# Patient Record
Sex: MALE | Race: WHITE | NOT HISPANIC OR LATINO | Employment: FULL TIME | URBAN - METROPOLITAN AREA
[De-identification: names, ages, dates, MRNs, and addresses within clinical notes are randomized per-mention and may not be internally consistent; named-entity substitution may affect disease eponyms.]

---

## 2024-06-01 ENCOUNTER — HOSPITAL ENCOUNTER (EMERGENCY)
Facility: HOSPITAL | Age: 39
Discharge: HOME | End: 2024-06-01
Attending: EMERGENCY MEDICINE
Payer: COMMERCIAL

## 2024-06-01 VITALS
TEMPERATURE: 96.8 F | HEART RATE: 69 BPM | BODY MASS INDEX: 25.98 KG/M2 | OXYGEN SATURATION: 99 % | DIASTOLIC BLOOD PRESSURE: 76 MMHG | WEIGHT: 220 LBS | RESPIRATION RATE: 18 BRPM | SYSTOLIC BLOOD PRESSURE: 136 MMHG | HEIGHT: 77 IN

## 2024-06-01 DIAGNOSIS — F14.922: Primary | ICD-10-CM

## 2024-06-01 LAB
ALBUMIN SERPL BCP-MCNC: 5.1 G/DL (ref 3.4–5)
ALP SERPL-CCNC: 68 U/L (ref 33–120)
ALT SERPL W P-5'-P-CCNC: 23 U/L (ref 10–52)
AMPHETAMINES UR QL SCN: ABNORMAL
ANION GAP SERPL CALC-SCNC: 17 MMOL/L (ref 10–20)
APPEARANCE UR: CLEAR
AST SERPL W P-5'-P-CCNC: 22 U/L (ref 9–39)
BARBITURATES UR QL SCN: ABNORMAL
BASOPHILS # BLD AUTO: 0.04 X10*3/UL (ref 0–0.1)
BASOPHILS NFR BLD AUTO: 0.4 %
BENZODIAZ UR QL SCN: ABNORMAL
BILIRUB SERPL-MCNC: 0.4 MG/DL (ref 0–1.2)
BILIRUB UR STRIP.AUTO-MCNC: NEGATIVE MG/DL
BUN SERPL-MCNC: 19 MG/DL (ref 6–23)
BZE UR QL SCN: ABNORMAL
CALCIUM SERPL-MCNC: 9.6 MG/DL (ref 8.6–10.3)
CANNABINOIDS UR QL SCN: ABNORMAL
CHLORIDE SERPL-SCNC: 103 MMOL/L (ref 98–107)
CO2 SERPL-SCNC: 23 MMOL/L (ref 21–32)
COLOR UR: NORMAL
CREAT SERPL-MCNC: 1.18 MG/DL (ref 0.5–1.3)
EGFRCR SERPLBLD CKD-EPI 2021: 82 ML/MIN/1.73M*2
EOSINOPHIL # BLD AUTO: 0.01 X10*3/UL (ref 0–0.7)
EOSINOPHIL NFR BLD AUTO: 0.1 %
ERYTHROCYTE [DISTWIDTH] IN BLOOD BY AUTOMATED COUNT: 11.9 % (ref 11.5–14.5)
FENTANYL+NORFENTANYL UR QL SCN: ABNORMAL
GLUCOSE SERPL-MCNC: 113 MG/DL (ref 74–99)
GLUCOSE UR STRIP.AUTO-MCNC: NORMAL MG/DL
HCT VFR BLD AUTO: 46.8 % (ref 41–52)
HGB BLD-MCNC: 16.4 G/DL (ref 13.5–17.5)
HOLD SPECIMEN: NORMAL
HYALINE CASTS #/AREA URNS AUTO: ABNORMAL /LPF
IMM GRANULOCYTES # BLD AUTO: 0.06 X10*3/UL (ref 0–0.7)
IMM GRANULOCYTES NFR BLD AUTO: 0.7 % (ref 0–0.9)
KETONES UR STRIP.AUTO-MCNC: NEGATIVE MG/DL
LEUKOCYTE ESTERASE UR QL STRIP.AUTO: NEGATIVE
LYMPHOCYTES # BLD AUTO: 1.44 X10*3/UL (ref 1.2–4.8)
LYMPHOCYTES NFR BLD AUTO: 16.2 %
MAGNESIUM SERPL-MCNC: 1.95 MG/DL (ref 1.6–2.4)
MCH RBC QN AUTO: 31 PG (ref 26–34)
MCHC RBC AUTO-ENTMCNC: 35 G/DL (ref 32–36)
MCV RBC AUTO: 89 FL (ref 80–100)
METHADONE UR QL SCN: ABNORMAL
MONOCYTES # BLD AUTO: 0.53 X10*3/UL (ref 0.1–1)
MONOCYTES NFR BLD AUTO: 6 %
MUCOUS THREADS #/AREA URNS AUTO: ABNORMAL /LPF
NEUTROPHILS # BLD AUTO: 6.82 X10*3/UL (ref 1.2–7.7)
NEUTROPHILS NFR BLD AUTO: 76.6 %
NITRITE UR QL STRIP.AUTO: NEGATIVE
NRBC BLD-RTO: 0 /100 WBCS (ref 0–0)
OPIATES UR QL SCN: ABNORMAL
OXYCODONE+OXYMORPHONE UR QL SCN: ABNORMAL
PCP UR QL SCN: ABNORMAL
PH UR STRIP.AUTO: 5.5 [PH]
PLATELET # BLD AUTO: 251 X10*3/UL (ref 150–450)
POTASSIUM SERPL-SCNC: 4 MMOL/L (ref 3.5–5.3)
PROT SERPL-MCNC: 7.7 G/DL (ref 6.4–8.2)
PROT UR STRIP.AUTO-MCNC: NORMAL MG/DL
RBC # BLD AUTO: 5.29 X10*6/UL (ref 4.5–5.9)
RBC # UR STRIP.AUTO: NEGATIVE /UL
RBC #/AREA URNS AUTO: ABNORMAL /HPF
SODIUM SERPL-SCNC: 139 MMOL/L (ref 136–145)
SP GR UR STRIP.AUTO: 1.01
UROBILINOGEN UR STRIP.AUTO-MCNC: NORMAL MG/DL
WBC # BLD AUTO: 8.9 X10*3/UL (ref 4.4–11.3)
WBC #/AREA URNS AUTO: ABNORMAL /HPF

## 2024-06-01 PROCEDURE — 80053 COMPREHEN METABOLIC PANEL: CPT

## 2024-06-01 PROCEDURE — 83735 ASSAY OF MAGNESIUM: CPT

## 2024-06-01 PROCEDURE — 96360 HYDRATION IV INFUSION INIT: CPT

## 2024-06-01 PROCEDURE — 99283 EMERGENCY DEPT VISIT LOW MDM: CPT | Mod: 25

## 2024-06-01 PROCEDURE — 81001 URINALYSIS AUTO W/SCOPE: CPT | Mod: 59 | Performed by: EMERGENCY MEDICINE

## 2024-06-01 PROCEDURE — 80307 DRUG TEST PRSMV CHEM ANLYZR: CPT

## 2024-06-01 PROCEDURE — 85025 COMPLETE CBC W/AUTO DIFF WBC: CPT

## 2024-06-01 PROCEDURE — 2500000001 HC RX 250 WO HCPCS SELF ADMINISTERED DRUGS (ALT 637 FOR MEDICARE OP): Performed by: EMERGENCY MEDICINE

## 2024-06-01 PROCEDURE — 96361 HYDRATE IV INFUSION ADD-ON: CPT

## 2024-06-01 PROCEDURE — 36415 COLL VENOUS BLD VENIPUNCTURE: CPT

## 2024-06-01 PROCEDURE — 2500000004 HC RX 250 GENERAL PHARMACY W/ HCPCS (ALT 636 FOR OP/ED)

## 2024-06-01 RX ORDER — LORAZEPAM 0.5 MG/1
1 TABLET ORAL ONCE
Status: COMPLETED | OUTPATIENT
Start: 2024-06-01 | End: 2024-06-01

## 2024-06-01 RX ADMIN — LORAZEPAM 1 MG: 0.5 TABLET ORAL at 09:49

## 2024-06-01 RX ADMIN — SODIUM CHLORIDE, POTASSIUM CHLORIDE, SODIUM LACTATE AND CALCIUM CHLORIDE 1000 ML: 600; 310; 30; 20 INJECTION, SOLUTION INTRAVENOUS at 05:38

## 2024-06-01 ASSESSMENT — PAIN DESCRIPTION - PROGRESSION: CLINICAL_PROGRESSION: NOT CHANGED

## 2024-06-01 ASSESSMENT — LIFESTYLE VARIABLES
TOTAL SCORE: 4
HAVE PEOPLE ANNOYED YOU BY CRITICIZING YOUR DRINKING: YES
HAVE YOU EVER FELT YOU SHOULD CUT DOWN ON YOUR DRINKING: YES
EVER HAD A DRINK FIRST THING IN THE MORNING TO STEADY YOUR NERVES TO GET RID OF A HANGOVER: YES
EVER FELT BAD OR GUILTY ABOUT YOUR DRINKING: YES

## 2024-06-01 ASSESSMENT — PAIN DESCRIPTION - PAIN TYPE: TYPE: ACUTE PAIN

## 2024-06-01 ASSESSMENT — PAIN SCALES - GENERAL: PAINLEVEL_OUTOF10: 2

## 2024-06-01 ASSESSMENT — PAIN - FUNCTIONAL ASSESSMENT: PAIN_FUNCTIONAL_ASSESSMENT: 0-10

## 2024-06-01 NOTE — PROGRESS NOTES
I received Irineo Jimenez in signout from Dr. Martin.  Please see the previous note for all HPI, PE and MDM up to the time of signout at 0700.    In brief Irineo Jimenez is an 39 y.o. male presenting for Cocaine use and heart racing  Chief Complaint   Patient presents with    Palpitations   .  At the time of signout we were awaiting: Metabolizing to freedom    Patient presents after he used cocaine and has heart racing.  Patient has no evidence of significant agitation on my exam.  Patient asked for some Ativan which was given to him with good results and the patient was able to be discharged home.        Pt Disposition: discharged

## 2024-06-01 NOTE — ED PROVIDER NOTES
HPI   Chief Complaint   Patient presents with    Chest Pain    Palpitations       39-year-old male past medical history of bipolar 1 and frequent recreational substance use who presents today for cocaine intoxication.  Patient states that he was out earlier and used cocaine.  He then developed tachycardia on his smart watch as high as the 150s.  He also endorses sensation that he urinated his pants, but when he felt his pants they were not wet.  He does also endorse alcohol use tonight.  He states that he called 911 because he was concerned that he took laced cocaine.  He denies any current chest pain or shortness of breath.  He states that if he felt the way he feels now, he would not have called 911.  Denies any abdominal pain nausea vomiting diarrhea.  Denies any headache changes vision changes in hearing.  Denies any numbness weakness or tingling in his upper extremities, does have some tingling in his thighs, but no tingling in his feet.      History provided by:  Patient and EMS personnel   used: No                        [place] Coma Scale Score: 15                     Patient History   No past medical history on file.  No past surgical history on file.  No family history on file.  Social History     Tobacco Use    Smoking status: Not on file    Smokeless tobacco: Not on file   Substance Use Topics    Alcohol use: Not on file    Drug use: Not on file       Physical Exam   ED Triage Vitals [06/01/24 0529]   Temperature Heart Rate Respirations BP   36 °C (96.8 °F) (!) 138 20 (!) 174/107      Pulse Ox Temp src Heart Rate Source Patient Position   100 % -- Monitor --      BP Location FiO2 (%)     Right arm --       Physical Exam  Constitutional:       General: He is not in acute distress.     Appearance: Normal appearance. He is well-developed and normal weight. He is not ill-appearing or diaphoretic.   HENT:      Head: Normocephalic and atraumatic.      Mouth/Throat:      Mouth: Mucous  membranes are moist.      Pharynx: No oropharyngeal exudate or posterior oropharyngeal erythema.   Eyes:      General: No scleral icterus.     Extraocular Movements: Extraocular movements intact.      Pupils: Pupils are equal, round, and reactive to light.   Cardiovascular:      Rate and Rhythm: Regular rhythm. Tachycardia present.      Pulses: Normal pulses.           Radial pulses are 2+ on the right side and 2+ on the left side.      Heart sounds: Normal heart sounds. No murmur heard.     No gallop.   Pulmonary:      Effort: Pulmonary effort is normal. No tachypnea or respiratory distress.      Breath sounds: Normal breath sounds. No stridor. No decreased breath sounds, wheezing, rhonchi or rales.   Chest:      Chest wall: No tenderness.   Abdominal:      General: Bowel sounds are normal. There is no distension.      Palpations: Abdomen is soft. There is no mass.      Tenderness: There is no abdominal tenderness. There is no guarding or rebound.      Hernia: No hernia is present.   Musculoskeletal:         General: No swelling, deformity or signs of injury. Normal range of motion.      Cervical back: Normal range of motion and neck supple. No tenderness.      Right lower leg: No tenderness. No edema.      Left lower leg: No tenderness. No edema.   Skin:     General: Skin is warm.      Capillary Refill: Capillary refill takes less than 2 seconds.      Findings: No erythema, lesion or rash.   Neurological:      General: No focal deficit present.      Mental Status: He is alert and oriented to person, place, and time. Mental status is at baseline.   Psychiatric:         Mood and Affect: Mood is anxious.         Behavior: Behavior normal.      Comments: Mild psychomotor agitation, mildly hyperverbal.  Denies SI, HI, AVH.         ED Course & MDM   Diagnoses as of 06/01/24 0549   Cocaine intoxication with perceptual disturbance (Multi)       Medical Decision Making  39-year-old male past medical history of bipolar 1  and frequent recreational substance use who presents today for cocaine intoxication.  Patient does indeed appear to be intoxicated on cocaine, as he is hypertensive and tachycardic.  The patient is not currently experiencing chest pain or having diaphoresis, so my concern for there being cardiac complications is low.  Given this, we will not get a troponin.  However, given the fact the patient had sensory changes which are abnormal for him, we will get CBC RFP and mag to confirm he has no electrolyte abnormalities causing this.  Patient also requested a urine drug screen to look for other drugs that may have been what he used.  This seems reasonable to me.  I did also offer him lorazepam to help with his anxiety and also help with the stimulant intoxication.  Patient declined and would prefer to have it naturally come down on his home, which I think is reasonable.  Patient's labs were within normal limits other than a mildly elevated glucose, but is not fasting. Patient will be signed out to the oncoming team pending urine drug screen and clinical sobriety.        Procedure  Procedures     Maxwell Mancuso MD  Resident  06/01/24 4991       Maxwell Mancuso MD  Resident  06/01/24 0727

## 2024-06-01 NOTE — ED TRIAGE NOTES
[place] due to chest discomfort and palpitations after drinking alcohol and took coccaine. States that he is sober for 10 months until tonight. PMH of Bipolar on Depakote but haven't taken it for 4 days.

## 2025-05-22 ENCOUNTER — OFFICE VISIT (OUTPATIENT)
Dept: ORTHOPEDIC SURGERY | Facility: HOSPITAL | Age: 40
End: 2025-05-22
Payer: COMMERCIAL

## 2025-05-22 ENCOUNTER — HOSPITAL ENCOUNTER (OUTPATIENT)
Dept: RADIOLOGY | Facility: CLINIC | Age: 40
Discharge: HOME | End: 2025-05-22
Payer: COMMERCIAL

## 2025-05-22 ENCOUNTER — APPOINTMENT (OUTPATIENT)
Dept: RADIOLOGY | Facility: HOSPITAL | Age: 40
End: 2025-05-22
Payer: COMMERCIAL

## 2025-05-22 VITALS — WEIGHT: 220 LBS | HEIGHT: 75 IN | BODY MASS INDEX: 27.35 KG/M2

## 2025-05-22 DIAGNOSIS — M25.512 LEFT SHOULDER PAIN, UNSPECIFIED CHRONICITY: ICD-10-CM

## 2025-05-22 DIAGNOSIS — M75.102 TEAR OF LEFT ROTATOR CUFF, UNSPECIFIED TEAR EXTENT, UNSPECIFIED WHETHER TRAUMATIC: ICD-10-CM

## 2025-05-22 PROCEDURE — 73221 MRI JOINT UPR EXTREM W/O DYE: CPT | Mod: LT

## 2025-05-22 PROCEDURE — 99204 OFFICE O/P NEW MOD 45 MIN: CPT | Performed by: STUDENT IN AN ORGANIZED HEALTH CARE EDUCATION/TRAINING PROGRAM

## 2025-05-22 PROCEDURE — 99214 OFFICE O/P EST MOD 30 MIN: CPT | Performed by: STUDENT IN AN ORGANIZED HEALTH CARE EDUCATION/TRAINING PROGRAM

## 2025-05-22 PROCEDURE — 3008F BODY MASS INDEX DOCD: CPT | Performed by: STUDENT IN AN ORGANIZED HEALTH CARE EDUCATION/TRAINING PROGRAM

## 2025-05-22 PROCEDURE — 1036F TOBACCO NON-USER: CPT | Performed by: STUDENT IN AN ORGANIZED HEALTH CARE EDUCATION/TRAINING PROGRAM

## 2025-05-22 ASSESSMENT — PAIN SCALES - GENERAL: PAINLEVEL_OUTOF10: 6

## 2025-05-22 ASSESSMENT — PAIN - FUNCTIONAL ASSESSMENT: PAIN_FUNCTIONAL_ASSESSMENT: 0-10

## 2025-05-22 NOTE — PROGRESS NOTES
PRIMARY CARE PHYSICIAN: No primary care provider on file.  REFERRING PROVIDER: No referring provider defined for this encounter.     CONSULT ORTHOPAEDIC: Shoulder Evaluation    ASSESSMENT & PLAN    Impression: 39 y.o. male with subacute bilateral shoulder pain secondary to traumatic injury. He has bilateral pain and loss of strength with internal rotation and abduction as well as tenderness to palpation of bilateral shoulders concerning for bilateral rotator cuff tears with or without biceps tendon injury. Imaging of the right shoulder confirms rotator cuff tear and medial bicep tendon subluxation, but the left shoulder is more painful with equally limited strength and ROM.    Plan:   I explained to the patient the nature of their diagnosis.  I reviewed their imaging studies with them.    Based on the history, physical exam and imaging studies above, the patient's presentation is consistent with the above diagnosis.  I had a long discussion with the patient regarding their presentation and the treatment options.  We discussed that he would likely benefit from right side rotator cuff repair with bicep tenodesis as well as further diagnostic workup with MRI for the more painful left shoulder to better guide further management.     Follow-Up: Patient will follow-up after MRI is completed to discuss subsequent management    At the end of the visit, all questions were answered in full. The patient is in agreement with the plan and recommendations. They will call the office with any questions/concerns.    Note dictated with [employer] software. Completed without full typed error editing and sent to avoid delay.       SUBJECTIVE  CHIEF COMPLAINT:   Chief Complaint   Patient presents with    Right Shoulder - Pain        HPI: Irineo Jimenez is a 39 y.o. male who presents today with right shoulder pain. 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 Bilateral shoulder XR and right  shoulder MRI imported into chart from [facility]. Since his injury, Irineo reports bilateral shoulder pain and weakness. He has returned to work, as able. He is currently scheduled for open rotator cuff repair of the right shoulder at [facility] next week but is seeking a second opinion. He denies any past history of injury to either shoulder. Reports that the left shoulder is more painful than the right and occasionally experiences some numbness and tingling in his hand, and he is concerned about having surgery on his right shoulder next week and being dependent on his left during recovery.    They deny any associated neck pain.    REVIEW OF SYSTEMS  Constitutional: See HPI for pain assessment, No significant weight loss, recent trauma  Cardiovascular: No chest pain, shortness of breath  Respiratory: No difficulty breathing, cough  Gastrointestinal: No nausea, vomiting, diarrhea, constipation  Musculoskeletal: Noted in HPI, positive for pain, restricted motion, stiffness  Integumentary: No rashes, easy bruising, redness   Neurological: no numbness or tingling in extremities, no gait disturbances   Psychiatric: No mood changes, memory changes, social issues  Heme/Lymph: no excessive swelling, easy bruising, excessive bleeding  ENT: No hearing changes  Eyes: No vision changes    Medical History[1]     Allergies[2]     Surgical History[3]     Family History[4]     Social History     Socioeconomic History    Marital status: Single     Spouse name: Not on file    Number of children: Not on file    Years of education: Not on file    Highest education level: Not on file   Occupational History    Not on file   Tobacco Use    Smoking status: Unknown    Smokeless tobacco: Not on file   Substance and Sexual Activity    Alcohol use: Not on file    Drug use: Not on file    Sexual activity: Not on file   Other Topics Concern    Not on file   Social History Narrative    Not on file     Social Drivers of Health     Financial Resource Strain:  Low Risk  (5/8/2025)    Received from [facility]    Overall Financial Resource Strain (CARDIA)     Difficulty of Paying Living Expenses: Not hard at all   Food Insecurity: No Food Insecurity (5/8/2025)    Received from [facility]    Hunger Vital Sign     Worried About Running Out of Food in the Last Year: Never true     Ran Out of Food in the Last Year: Never true   Transportation Needs: No Transportation Needs (5/8/2025)    Received from [facility]    PRAPARE - Transportation     Lack of Transportation (Medical): No     Lack of Transportation (Non-Medical): No   Physical Activity: Sufficiently Active (5/8/2025)    Received from [facility]    Exercise Vital Sign     Days of Exercise per Week: 5 days     Minutes of Exercise per Session: 30 min   Stress: No Stress Concern Present (5/8/2025)    Received from [facility]    [place] [employer] - Occupational Stress Questionnaire     Feeling of Stress : Not at all   Social Connections: Socially Isolated (5/8/2025)    Received from [facility]    Social Connection and Isolation Panel [NHANES]     Frequency of Communication with Friends and Family: Never     Frequency of Social Gatherings with Friends and Family: Never     Attends Nondenominational Services: Never     Active Member of Clubs or Organizations: No     Attends Club or Organization Meetings: Never     Marital Status:    Intimate Partner Violence: Not At Risk (5/16/2023)    Received from [employer]    Humiliation, Afraid, Rape, and Kick questionnaire     Fear of Current or Ex-Partner: No     Emotionally Abused: No     Physically Abused: No     Sexually Abused: No   Housing Stability: Unknown (5/8/2025)    Received from [facility]    Housing Stability Vital Sign     Unable to Pay for Housing in the Last Year: No     Number of Times Moved in the Last Year: Not on file     Homeless in the Last Year: Not on file        CURRENT MEDICATIONS:   Current  "Medications[5]     OBJECTIVE    PHYSICAL EXAM      6/1/2024     5:29 AM 6/1/2024     7:42 AM 6/1/2024     9:15 AM 6/1/2024    10:00 AM 6/1/2024    11:00 AM 6/1/2024    12:00 PM 6/1/2024    12:46 PM   Vitals   Systolic 174 138 152    136   Diastolic 107 103 83    76   BP Location Right arm Right arm Right arm    Right arm   Heart Rate 138 124 113 100 82 65 69   Temp 36 °C (96.8 °F)         Resp 20 20 17 23 21 16 18   Height 1.956 m (6' 5\")         Weight (lb) 220         BMI 26.09 kg/m2         BSA (m2) 2.33 m2            There is no height or weight on file to calculate BMI.    GENERAL: A/Ox3, NAD. Appears healthy, well nourished  PSYCHIATRIC: Mood stable, appropriate memory recall  EYES: EOM intact, no scleral icterus  CARDIOVASCULAR: Palpable peripheral pulses  LUNGS: Breathing non-labored on room air  SKIN: no erythema, rashes, or ecchymosis     MUSCULOSKELETAL:  Laterality: bilateral Shoulder Exam  - Negative Spurlings, full painless neck ROM  - Skin intact  - No erythema or warmth  - No ecchymosis or soft tissue swelling  - Shoulder ROM:  Active abduction limited to 90 degrees; passive ROM normal  - Strength:      Jobes 4/5     ER 5/5     Belly press and bearhug 3/5 bilaterally  - Palpation: tenderness of right anterior shoulder; tenderness of left anterior, superior, and posterior shoulder  - De and Neers positive  - Speeds and O'Briens positive  - Stability: Anterior/Posterior load and shift normal  - Special Tests:   Yergason test positive on right, weak positive on left    NEUROVASCULAR:  - Neurovascular Status: sensation intact to light touch distally, upper extremity motor grossly intact  - Capillary refill brisk at extremities, Bilateral peripheral pulses 2+    Imaging: Multiple views of the affected bilateral shoulder(s) demonstrate: no signs of acute osseus damage on XR, anterior ossification along anterior right shoulder on XR. MRI of right shoulder shows: full-thickness subscapularis tear " without atrophy with medial subluxation of biceps tendon and supraspinatus tendinopathy evidence of acute tear.   Images were personally reviewed and interpreted by me.  Radiology reports were reviewed by me as well, if readily available at the time.      Boris Lopez MD  Attending Surgeon    Sports Medicine Orthopaedic Surgery  [facility]          [1] No past medical history on file.  [2]   Allergies  Allergen Reactions    Opioids - Morphine Analogues Other    Opioids-Meperidine And Related Other    Opioids-Methadone And Related Other    Oxycontin [Oxycodone] Other    Percocet [Oxycodone-Acetaminophen] Other   [3] No past surgical history on file.  [4] No family history on file.  [5]   No current outpatient medications on file.     No current facility-administered medications for this visit.

## 2025-07-30 ENCOUNTER — APPOINTMENT (OUTPATIENT)
Dept: ORTHOPEDIC SURGERY | Age: 40
End: 2025-07-30
Payer: COMMERCIAL

## 2025-08-01 ENCOUNTER — HOSPITAL ENCOUNTER (OUTPATIENT)
Dept: RADIOLOGY | Facility: EXTERNAL LOCATION | Age: 40
Discharge: HOME | End: 2025-08-01

## 2025-08-05 ENCOUNTER — HOSPITAL ENCOUNTER (OUTPATIENT)
Dept: RADIOLOGY | Facility: CLINIC | Age: 40
Discharge: HOME | End: 2025-08-05
Payer: COMMERCIAL

## 2025-08-05 ENCOUNTER — OFFICE VISIT (OUTPATIENT)
Dept: ORTHOPEDIC SURGERY | Facility: CLINIC | Age: 40
End: 2025-08-05
Payer: COMMERCIAL

## 2025-08-05 DIAGNOSIS — M25.511 ACUTE PAIN OF RIGHT SHOULDER: ICD-10-CM

## 2025-08-05 PROCEDURE — 99204 OFFICE O/P NEW MOD 45 MIN: CPT | Performed by: ORTHOPAEDIC SURGERY

## 2025-08-05 PROCEDURE — 99212 OFFICE O/P EST SF 10 MIN: CPT | Performed by: ORTHOPAEDIC SURGERY

## 2025-08-05 PROCEDURE — 73030 X-RAY EXAM OF SHOULDER: CPT | Mod: RIGHT SIDE | Performed by: RADIOLOGY

## 2025-08-05 PROCEDURE — 73030 X-RAY EXAM OF SHOULDER: CPT | Mod: RT

## 2025-08-05 NOTE — PROGRESS NOTES
History of Present Illness:   Patient presents today for ongoing evaluation for right shoulder pain and biceps deformity.  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  He noted a subscapularis tear at that time as well as a lesser tuberosity fracture.  He was formally evaluated and underwent an operation to fix the lesser tuberosity as well as subscapularis and biceps tendon, patient notes that biceps tenodesis was performed during this time.  About 3 weeks ago the patient was lifting his daughter and felt a pain in the right shoulder and noted a biceps deformity/Arvin deformity after this.  He notes that there is some weakness to forward flexion as well as supination and he can feel this while he is working.  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    Per the chart review there was a fall after the surgery, resulting in changes on the X ray to suggest lesser tuberosity fracture timeline.     He had received multiple opinions from outside orthopedic providers for long head biceps tendon rupture.  He had been given options for open subpectoral tenodesis versus nonoperative management.    Of note, he is also here for the left shoulder today for a known SLAP tear with Morris complex from previous MRI, this is related to his original injury but is not bothering him nearly as much as the right shoulder today.    Medical History[1]  Surgical History[2]  Current Medications[3]    Review of Systems   GENERAL: Negative  GI: Negative  MUSCULOSKELETAL: See HPI  SKIN: Negative  NEURO:  Negative    Physical Examination:  Right Shoulder:  Skin healthy to gross inspection  No ecchymosis, no edema, no gross atrophy  No tenderness to palpation over acromioclavicular joint  Moderate tenderness to palpation over biceps tendon along the distal  segment of the proximal muscle-tendon junction.   No tenderness to palpation over the cervical spine     ROM:  Full forward flexion  Full external rotation  IR symmetric to contralateral side  Strength:  5/5 Resisted elevation  5/5 Resisted external rotation    Negative lift off test   Negative Spurling´s test  Negative Neer and Hawking´s test  Positive Speed's test  Biceps muscle belly deformity noted with minimal tension proximally with active resisted supination and elbow flexion compared to contralateral side.   Neurovascular exam normal distally     Imaging:  Previous plain films of the right shoulder had revealed healing fracture of the lesser tuberosity, there also appears to be some cortical changes at the proximal humerus that could represent postsurgical changes    The initial MRI of the right shoulder had revealed full-thickness tear of the superior fibers of the subscapularis tendon with medial subluxation of proximal biceps tendon, supraspinatus tendinopathy without discrete tear.  Postoperative MRI reveals postsurgical changes seen after subscapularis tendon repair without tendon retraction.  Long head of biceps was not visualized during the study.    MRI of the left shoulder reveals nondisplaced type II SLAP tear with incidental Morris complex noted, no rotator cuff pathology    X-ray today of the right shoulder reveals:     Assessment:   Patient with ongoing right shoulder pain and biceps deformity concern for long head biceps tendon rupture status post tenodesis.  Additional concern for deformity of the proximal humerus as it relates to surgical changes that could represent failure of an anchor versus lengthening of the tendon proximally.    Patient with left shoulder pain, labrum tear/SLAP tear, [place] complex.    Plan:  We reviewed the patient with the right shoulder is presenting with signs of long head biceps tendon rupture status post tenodesis given deformity and medial subluxation.  We  additionally discussed the left shoulder SLAP tear may be progressing towards arthroscopic intervention however given the acute nature of the right shoulder we would prioritize this.  Encouraged new x-rays today to evaluate the proximal third humerus, he is agreeable.  Additionally discussed options for surgical intervention for open subpectoral tenodesis, stat MRI, nonoperative management, etc.  We did also review with the patient the pertinent research studies for age/function related tenodesis versus nonoperative management/tenotomy in the appropriate patient population and activity level.       Mauricio Alejandre PA-C      In a face to face encounter, I evaluated the patient and performed a physical examination, discussed pertinent diagnostic studies if indicated and discussed diagnosis and management strategies with both the patient and physician assistant / nurse practitioner.  I reviewed the PA/NP's note and agree with the documented findings and plan of care.    Left shoulder/arm :  we reviewed with the patient that exam of his subscapularis from a strength standpoint examines well.  We discussed there is some mild stiffness encouraged continued physical therapy to help with range of motion.  Regarding his biceps we discussed sometimes it can become attenuated during the recovery so it is hard to always determine the length tension relationship of the repair and we do not anticipate significant functional deficits but potentially predominant just cosmetic issues.  However we reviewed that certain high activity people may have some minor cramping spasms or very subtle weakness.  Based on his degree of activity could certainly could consider revision surgery for the biceps but discussed that with scar tissue prior repair etc. would not be an easy surgery.    Rt Shoulder:  Reviewed the labral pathology on his MRI but overall doing very well.    Rico Graves MD           [1] No past medical history on file.  [2]  No past surgical history on file.  [3] No current outpatient medications on file.

## 2025-08-07 ENCOUNTER — HOSPITAL ENCOUNTER (EMERGENCY)
Facility: HOSPITAL | Age: 40
Discharge: HOME | End: 2025-08-07
Attending: EMERGENCY MEDICINE
Payer: MEDICARE

## 2025-08-07 ENCOUNTER — APPOINTMENT (OUTPATIENT)
Dept: RADIOLOGY | Facility: HOSPITAL | Age: 40
End: 2025-08-07
Payer: MEDICARE

## 2025-08-07 VITALS
SYSTOLIC BLOOD PRESSURE: 154 MMHG | BODY MASS INDEX: 27.5 KG/M2 | TEMPERATURE: 98.6 F | RESPIRATION RATE: 18 BRPM | HEART RATE: 87 BPM | OXYGEN SATURATION: 97 % | WEIGHT: 220 LBS | DIASTOLIC BLOOD PRESSURE: 81 MMHG

## 2025-08-07 DIAGNOSIS — M25.511 ACUTE PAIN OF RIGHT SHOULDER: ICD-10-CM
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
## 2025-08-07 DIAGNOSIS — S29.012A MUSCLE STRAIN OF UPPER BACK: ICD-10-CM

## 2025-08-07 PROCEDURE — 73030 X-RAY EXAM OF SHOULDER: CPT | Mod: RIGHT SIDE | Performed by: RADIOLOGY

## 2025-08-07 PROCEDURE — 2500000001 HC RX 250 WO HCPCS SELF ADMINISTERED DRUGS (ALT 637 FOR MEDICARE OP)

## 2025-08-07 PROCEDURE — 99283 EMERGENCY DEPT VISIT LOW MDM: CPT | Performed by: EMERGENCY MEDICINE

## 2025-08-07 PROCEDURE — 73030 X-RAY EXAM OF SHOULDER: CPT | Mod: RT

## 2025-08-07 RX ORDER — METHOCARBAMOL 500 MG/1
500 TABLET, FILM COATED ORAL EVERY 12 HOURS PRN
Qty: 20 TABLET | Refills: 0 | Status: SHIPPED | OUTPATIENT
Start: 2025-08-07 | End: 2025-08-17

## 2025-08-07 RX ORDER — IBUPROFEN 600 MG/1
600 TABLET, FILM COATED ORAL ONCE
Status: COMPLETED | OUTPATIENT
Start: 2025-08-07 | End: 2025-08-07

## 2025-08-07 RX ADMIN — IBUPROFEN 600 MG: 600 TABLET ORAL at 17:29

## 2025-08-07 NOTE — DISCHARGE INSTRUCTIONS
Recommend using your shoulder sling until your appointment tomorrow.  Please go to your orthopedic appointment tomorrow.  You can use Tylenol and ibuprofen for pain control.  Ibuprofen also helps with inflammation.  Recommend ice for your back and shoulder tonight.
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
## 2025-08-08 ENCOUNTER — APPOINTMENT (OUTPATIENT)
Dept: PRIMARY CARE | Facility: CLINIC | Age: 40
End: 2025-08-08
Payer: COMMERCIAL

## 2025-08-08 VITALS
DIASTOLIC BLOOD PRESSURE: 84 MMHG | HEART RATE: 84 BPM | WEIGHT: 213 LBS | SYSTOLIC BLOOD PRESSURE: 121 MMHG | OXYGEN SATURATION: 98 % | BODY MASS INDEX: 26.62 KG/M2

## 2025-08-08 DIAGNOSIS — R10.31 RIGHT GROIN PAIN: Primary | ICD-10-CM

## 2025-08-08 PROCEDURE — 99214 OFFICE O/P EST MOD 30 MIN: CPT | Performed by: NURSE PRACTITIONER

## 2025-08-08 ASSESSMENT — PATIENT HEALTH QUESTIONNAIRE - PHQ9
SUM OF ALL RESPONSES TO PHQ9 QUESTIONS 1 AND 2: 0
1. LITTLE INTEREST OR PLEASURE IN DOING THINGS: NOT AT ALL
2. FEELING DOWN, DEPRESSED OR HOPELESS: NOT AT ALL

## 2025-08-08 ASSESSMENT — ENCOUNTER SYMPTOMS: DEPRESSION: 0
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
## 2025-08-10 ASSESSMENT — ENCOUNTER SYMPTOMS
NEUROLOGICAL NEGATIVE: 1
CONSTITUTIONAL NEGATIVE: 1
DIARRHEA: 0
CONSTIPATION: 0
ABDOMINAL DISTENTION: 0
MUSCULOSKELETAL NEGATIVE: 1
CARDIOVASCULAR NEGATIVE: 1
ABDOMINAL PAIN: 0
NAUSEA: 0
FREQUENCY: 0
RESPIRATORY NEGATIVE: 1